# Patient Record
Sex: MALE | Race: NATIVE HAWAIIAN OR OTHER PACIFIC ISLANDER | HISPANIC OR LATINO | URBAN - METROPOLITAN AREA
[De-identification: names, ages, dates, MRNs, and addresses within clinical notes are randomized per-mention and may not be internally consistent; named-entity substitution may affect disease eponyms.]

---
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
## 2024-07-25 NOTE — HPI: SKIN LESION
[6] : 6
[Denies] : Denies
[Yes] : Yes
[Declined] : Declined
[Informed consent documented in EHR.] : Informed consent documented in EHR.
[24g] : 24g
[Start Time: ___] : Medication Start Time: [unfilled]
[End Time: ___] : Medication End Time: [unfilled]
[Medication Name: ___] : Medication Name: [unfilled]
[Total Amount Administered: ___] : Total Amount Administered: [unfilled]
[IV discontinued. Intact. No signs or symptoms of IV complications noted. Time: ___] : IV discontinued. Intact. No signs or symptoms of IV complications noted. Time: [unfilled]
[Patient  instructed to seek medical attention with signs and symptoms of adverse effects] : Patient  instructed to seek medical attention with signs and symptoms of adverse effects
[Patient left unit in no acute distress] : Patient left unit in no acute distress
[Medications administered as ordered and tolerated well.] : Medications administered as ordered and tolerated well.
[de-identified] : knees
[de-identified] : patient ambulates with cane
[de-identified] : left arm cephalic vein 
[de-identified] : Patient presents for scheduled Gamunex infusion appointment, ambulating using cane in NAD. Today, patient reports pain to knees as rated above. Patient continues to report daily fatigue and having occasional dizziness. Denies recent falls. Otherwise, patient denies any other changes since last infusion. Patient premedicated and infusion titration tolerated well. 
What Type Of Note Output Would You Prefer (Optional)?: Standard Output
Has Your Skin Lesion Been Treated?: not been treated
Is This A New Presentation, Or A Follow-Up?: Skin Lesion
Additional History: Patient complains of of itchy rashes under his arms.